# Patient Record
Sex: FEMALE | Race: ASIAN | NOT HISPANIC OR LATINO | Employment: STUDENT | URBAN - METROPOLITAN AREA
[De-identification: names, ages, dates, MRNs, and addresses within clinical notes are randomized per-mention and may not be internally consistent; named-entity substitution may affect disease eponyms.]

---

## 2023-06-22 ENCOUNTER — APPOINTMENT (OUTPATIENT)
Dept: LAB | Facility: CLINIC | Age: 18
End: 2023-06-22
Payer: COMMERCIAL

## 2023-06-27 ENCOUNTER — OFFICE VISIT (OUTPATIENT)
Dept: INTERNAL MEDICINE | Facility: CLINIC | Age: 18
End: 2023-06-27
Payer: COMMERCIAL

## 2023-06-27 ENCOUNTER — LAB (OUTPATIENT)
Dept: LAB | Facility: CLINIC | Age: 18
End: 2023-06-27
Payer: COMMERCIAL

## 2023-06-27 VITALS
SYSTOLIC BLOOD PRESSURE: 104 MMHG | HEART RATE: 78 BPM | OXYGEN SATURATION: 99 % | DIASTOLIC BLOOD PRESSURE: 54 MMHG | WEIGHT: 116.3 LBS

## 2023-06-27 DIAGNOSIS — Z11.1 SCREENING EXAMINATION FOR PULMONARY TUBERCULOSIS: ICD-10-CM

## 2023-06-27 DIAGNOSIS — Z11.1 SCREENING EXAMINATION FOR PULMONARY TUBERCULOSIS: Primary | ICD-10-CM

## 2023-06-27 PROCEDURE — 36415 COLL VENOUS BLD VENIPUNCTURE: CPT | Performed by: PATHOLOGY

## 2023-06-27 PROCEDURE — 99000 SPECIMEN HANDLING OFFICE-LAB: CPT | Performed by: PATHOLOGY

## 2023-06-27 PROCEDURE — 86481 TB AG RESPONSE T-CELL SUSP: CPT | Performed by: INTERNAL MEDICINE

## 2023-06-27 PROCEDURE — 99202 OFFICE O/P NEW SF 15 MIN: CPT | Mod: GC

## 2023-06-27 RX ORDER — TRETINOIN 0.5 MG/G
CREAM TOPICAL AT BEDTIME
COMMUNITY

## 2023-06-27 ASSESSMENT — ENCOUNTER SYMPTOMS
APNEA: 0
NUMBNESS: 0
CHEST TIGHTNESS: 0
WEAKNESS: 0
DIZZINESS: 0
RESPIRATORY NEGATIVE: 1
PALPITATIONS: 0
ADENOPATHY: 0
FATIGUE: 0
TREMORS: 0
GASTROINTESTINAL NEGATIVE: 1
SHORTNESS OF BREATH: 0
CONSTITUTIONAL NEGATIVE: 1
FEVER: 0

## 2023-06-27 NOTE — NURSING NOTE
Swayashreyee B Dhungel is a 18 year old female that presents in clinic today for the following:     Chief Complaint   Patient presents with     Establish Care     Pt here for establish care and wants to have tb test for school       The patient's allergies and medications were reviewed. The patient's vitals were obtained without incident. The patient does not have any other questions for the provider.     Gerry De La Rosa, EMT at 12:05 PM on 6/27/2023.  Primary Care Clinic: [phone]

## 2023-06-27 NOTE — PROGRESS NOTES
"Dear patient. Thank you for visiting with me. I want you to feel respected, understood, and empowered. \"Respect\" is valuing you as much as I would a close family member. \"Empowerment\" happens when you are fully informed, and can make the best possible decision for you.  Please ask me questions!  Challenge anything that is not clear.    Medical records are primarily used as memory aids for me and my colleagues. Things to know about my documentation style:  - The 'problem list' includes current symptoms or diagnoses, and some problems that are resolved but may return. I use the past medical history for problems not expected to return.  - I use single quotation marks for things that you or I said, when I want to clarify who was speaking.  - I use double quotation marks when copying a term from elsewhere in your records. Italics (besides here) may also denote a quotation.  If you have questions or concerns, please contact me; I will reply as soon as time allows.      Subjective     Swayashreyee B Dhungel is a 18 year old woman who presents for TB Quantiferon Gold testing for her school. Does not need to establish care. Has PCP in [place]. 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 No other health concerns today. Does not take medications. No pertinent surgical, family, or social history.     PCP: System, Provider Not In   Visit type: preventive visit    Dicussed health maintenance topics, is up to date on screenings and vaccinations. Does not have any prior TB exposures or past infections. Was born in [facility] and was given the BCG vaccine.     Review of Systems   Constitutional: Negative.  Negative for fatigue and fever.   Respiratory: Negative.  Negative for apnea, chest tightness and shortness of breath.    Cardiovascular: Negative for chest pain and palpitations.   Gastrointestinal: Negative.    Genitourinary: Negative.    Skin: Negative.    Neurological: Negative for " dizziness, tremors, weakness and numbness.   Hematological: Negative for adenopathy.       As part of this encounter, I reviewed (and updated as appropriate) the past medical, family, and social history.  No pertinent family, medical, surgical, or social history. Is not seeking to establish care at this clinic.     Objective     /54 (BP Location: Right arm, Patient Position: Sitting, Cuff Size: Adult Regular)   Pulse 78   Wt 52.8 kg (116 lb 4.8 oz)   SpO2 99%     Physical Exam  Constitutional:       Appearance: Normal appearance. She is normal weight.   Cardiovascular:      Rate and Rhythm: Normal rate and regular rhythm.      Pulses: Normal pulses.      Heart sounds: Normal heart sounds.   Pulmonary:      Effort: Pulmonary effort is normal.      Breath sounds: Normal breath sounds.   Abdominal:      General: There is no distension.      Tenderness: There is no abdominal tenderness.   Neurological:      General: No focal deficit present.      Mental Status: She is alert and oriented to person, place, and time. Mental status is at baseline.            Assessment & Plan     TB testing   Preventative Screening  Pt requires TB testing for schoolwork at [employer]. Is otherwise up to date on pap smears, vaccines and screenings. Since she has gotten the BCG vaccine, will require Quant Gold TB testing. Does not have any prior exposures to TB or TB infected patients to her knowledge. Denies cough or SOB. Has PCP she follows in [place]    - Quantiferon Gold TB test ordered  - Will reach out to patient once results return with form.     No specialty comments available.        About this visit:  Time note ((n2, 20'): The total time (on the date of service) for this service was 20 minutes, including discussion/face-to-face, chart review, interpretation not otherwise reported, documentation, and updating of the computerized record.    Attending Addendum:  Patient seen and examined with resident in clinic  today.  Pertinent portions of history and exam were independently verified by myself.  I agree with the exam and plan as outlined above with the following modifications: none.  Suzanne Esqueda MD  Internal Medicine

## 2023-06-28 LAB
QUANTIFERON MITOGEN: 10 IU/ML
QUANTIFERON NIL TUBE: 0.06 IU/ML
QUANTIFERON TB1 TUBE: 0.08 IU/ML
QUANTIFERON TB2 TUBE: 0.05

## 2023-06-29 LAB
GAMMA INTERFERON BACKGROUND BLD IA-ACNC: 0.06 IU/ML
M TB IFN-G BLD-IMP: NEGATIVE
M TB IFN-G CD4+ BCKGRND COR BLD-ACNC: 9.94 IU/ML
MITOGEN IGNF BCKGRD COR BLD-ACNC: -0.01 IU/ML
MITOGEN IGNF BCKGRD COR BLD-ACNC: 0.02 IU/ML